# Patient Record
Sex: FEMALE | Race: WHITE | NOT HISPANIC OR LATINO | Employment: FULL TIME | URBAN - METROPOLITAN AREA
[De-identification: names, ages, dates, MRNs, and addresses within clinical notes are randomized per-mention and may not be internally consistent; named-entity substitution may affect disease eponyms.]

---
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
## 2021-01-09 ENCOUNTER — HEALTH MAINTENANCE LETTER (OUTPATIENT)
Age: 21
End: 2021-01-09

## 2021-08-11 ENCOUNTER — HOSPITAL ENCOUNTER (EMERGENCY)
Facility: CLINIC | Age: 21
Discharge: HOME | End: 2021-08-12
Attending: EMERGENCY MEDICINE | Admitting: EMERGENCY MEDICINE
Payer: COMMERCIAL

## 2021-08-11 ENCOUNTER — APPOINTMENT (OUTPATIENT)
Dept: CT IMAGING | Facility: CLINIC | Age: 21
End: 2021-08-11
Attending: EMERGENCY MEDICINE
Payer: COMMERCIAL

## 2021-08-11 VITALS
SYSTOLIC BLOOD PRESSURE: 111 MMHG | TEMPERATURE: 97.3 F | RESPIRATION RATE: 20 BRPM | HEART RATE: 76 BPM | OXYGEN SATURATION: 99 % | DIASTOLIC BLOOD PRESSURE: 63 MMHG

## 2021-08-11 DIAGNOSIS — S09.90XA CLOSED HEAD INJURY, INITIAL ENCOUNTER: ICD-10-CM

## 2021-08-11 DIAGNOSIS — S00.03XA CONTUSION OF SCALP, INITIAL ENCOUNTER: ICD-10-CM

## 2021-08-11 PROCEDURE — 70450 CT HEAD/BRAIN W/O DYE: CPT

## 2021-08-11 PROCEDURE — 250N000013 HC RX MED GY IP 250 OP 250 PS 637: Performed by: EMERGENCY MEDICINE

## 2021-08-11 PROCEDURE — 250N000011 HC RX IP 250 OP 636: Performed by: EMERGENCY MEDICINE

## 2021-08-11 PROCEDURE — 99284 EMERGENCY DEPT VISIT MOD MDM: CPT | Mod: 25

## 2021-08-11 RX ORDER — ONDANSETRON 4 MG/1
4 TABLET, ORALLY DISINTEGRATING ORAL ONCE
Status: COMPLETED | OUTPATIENT
Start: 2021-08-11 | End: 2021-08-11

## 2021-08-11 RX ORDER — ACETAMINOPHEN 500 MG
1000 TABLET ORAL ONCE
Status: COMPLETED | OUTPATIENT
Start: 2021-08-11 | End: 2021-08-11

## 2021-08-11 RX ADMIN — ONDANSETRON 4 MG: 4 TABLET, ORALLY DISINTEGRATING ORAL at 22:59

## 2021-08-11 RX ADMIN — ACETAMINOPHEN 1000 MG: 500 TABLET, FILM COATED ORAL at 22:59

## 2021-08-11 ASSESSMENT — ENCOUNTER SYMPTOMS
NECK PAIN: 0
VOMITING: 0
DIZZINESS: 1
CONFUSION: 1
HEADACHES: 1
NAUSEA: 1

## 2021-08-12 NOTE — ED NOTES
"Pt states headache 7/10 following \"nic\" falling from ceiling while in a chair. Pt endorses nausea and photophobia. Pupils 6mm equal bilaterally, PERRLA.  "

## 2021-08-12 NOTE — ED PROVIDER NOTES
History   Chief Complaint:  Head Pain     HPI   Libra Kelly is a 21 year old female who presents after being hit on the head by a dowel from a wooden swing. She was on a swing tonight which held by a wooden dowel. The dowel unexpectedly fell and hit the top of her head at approximately 2000 tonight. She denies any loss of consciousness, but developed headaches, nausea, and dizziness. Friend states she has been more confused and drowsy than her baseline. She denies any recent or ongoing vomiting or neck pain. She is otherwise healthy and denies any current medical problems. She did not take any Tylenol prior to arrival. She is vaccinated against COVID-19 and denies any current cold symptoms. She denies any currently known pregnancies.     Review of Systems   Gastrointestinal: Positive for nausea. Negative for vomiting.   Musculoskeletal: Negative for neck pain.   Neurological: Positive for dizziness and headaches. Negative for syncope.   Psychiatric/Behavioral: Positive for confusion.   All other systems reviewed and are negative.      Allergies:  Amoxicillin  Penicillin G    Medications:  The patient is not currently taking any prescribed medications.    Past Medical History:    The patient denies any significant past medical history.    Social History:  Arrives to the emergency department with a friend.     Physical Exam     Patient Vitals for the past 24 hrs:   BP Temp Temp src Pulse Resp SpO2   08/11/21 2132 111/63 97.3  F (36.3  C) Temporal 76 20 99 %       Physical Exam  General: Adult female sitting in a dimly lit room  Eyes: PERRL, Conjunctive within normal limits. EOMI.  HENT: Palpable hematoma and tenderness on the top of the head.  No skull deformity.  No hemotympanum.  Moist mucous membranes, oropharynx clear.   Neck: Nontender.  Normal active range of motion.  CV: Regular rate and rhythm  Resp:  Normal respiratory effort.  MSK: Moves all extremities equally.  Skin: Warm and dry. No rashes or  lesions or ecchymoses on visible skin.  Neuro: Alert and oriented. Responds appropriately to all questions and commands. No focal findings appreciated. Normal muscle tone.  Psych: Normal mood and affect.     Emergency Department Course     Imaging:  CT Head w/o Contrast  1.  No acute intracranial hemorrhage or calvarial fracture.  2.  Evidence of a possible Chiari I malformation. This could be further assessed by MRI.  Reading per radiology    Emergency Department Course:    Reviewed:  I reviewed nursing notes, vitals and past medical history    Assessments:  2249 I obtained history and examined the patient as noted above.   0050 I rechecked the patient and explained findings. She feels improved.     Interventions:  2259 Tylenol 1,000 mg PO  2259 Zofran 4 mg PO    Disposition:  The patient was discharged to home.     Impression & Plan     Medical Decision Making:    Libra Kelly is a 21 year old female who presents with a history of head injury.  The differential diagnosis includes skull fracture, epidural hematoma, subdural hematoma, intracerebral hemorrhage, and traumatic subarachnoid hemorrhage.  There are no physical signs of skull fracture or other bony fracture on exam and the patient is well-appearing, but due to the severity of headache and the stated confusion by a friend, a CT of the head was indicated.  Patient was not altered here in the emergency department but did appear uncomfortable.  Fortunately, no signs of intracerebral bleed or skull fracture were detected during this visit on CT imaging.  The patient understands that she should return to the emergency department should symptoms worsen despite worrisome findings on initial imaging.  I discussed the incidental possibility of a Chiari malformation.  Emergent MRI is not indicated.  The patient does not have chronic headaches and this can be further evaluation outpatient if thought indicated after discussion with her primary care provider.    Closed head injury instructions were given.  The patient was feeling improved on reassessment and was appropriate for discharge home.  All questions were answered prior to discharge    Diagnosis:    ICD-10-CM    1. Closed head injury, initial encounter  S09.90XA    2. Contusion of scalp, initial encounter  S00.03XA      Scribe Disclosure:  I, David Adrianne, am serving as a scribe at 10:13 PM on 8/11/2021 to document services personally performed by Silvia Spivey MD based on my observations and the provider's statements to me.              Silvia Spivey MD  08/12/21 9008

## 2021-08-12 NOTE — ED TRIAGE NOTES
Pt aox4, ABCs intact. Pt c/o concussion after she was sitting in a chair suspended from the ceiling and the piece of wood holding the chair up gave away and hit her in the head. Pt now having headache, nausea, and sensitivity to light and noise. Pt states that she has had 4 prior concussions.

## 2021-10-11 ENCOUNTER — HEALTH MAINTENANCE LETTER (OUTPATIENT)
Age: 21
End: 2021-10-11

## 2022-01-30 ENCOUNTER — HEALTH MAINTENANCE LETTER (OUTPATIENT)
Age: 22
End: 2022-01-30

## 2022-05-13 ENCOUNTER — HOSPITAL ENCOUNTER (EMERGENCY)
Facility: CLINIC | Age: 22
Discharge: HOME | End: 2022-05-14
Attending: EMERGENCY MEDICINE | Admitting: EMERGENCY MEDICINE
Payer: COMMERCIAL

## 2022-05-13 DIAGNOSIS — R10.32 ABDOMINAL PAIN, LEFT LOWER QUADRANT: ICD-10-CM

## 2022-05-13 LAB
ALBUMIN UR-MCNC: NEGATIVE MG/DL
APPEARANCE UR: CLEAR
BILIRUB UR QL STRIP: NEGATIVE
COLOR UR AUTO: NORMAL
GLUCOSE UR STRIP-MCNC: NEGATIVE MG/DL
HGB UR QL STRIP: NEGATIVE
HOLD SPECIMEN: NORMAL
KETONES UR STRIP-MCNC: NEGATIVE MG/DL
LEUKOCYTE ESTERASE UR QL STRIP: NEGATIVE
NITRATE UR QL: NEGATIVE
PH UR STRIP: 7 [PH] (ref 5–7)
SP GR UR STRIP: 1 (ref 1–1.03)
UROBILINOGEN UR STRIP-MCNC: NORMAL MG/DL

## 2022-05-13 PROCEDURE — 81003 URINALYSIS AUTO W/O SCOPE: CPT | Performed by: EMERGENCY MEDICINE

## 2022-05-13 PROCEDURE — 36415 COLL VENOUS BLD VENIPUNCTURE: CPT | Performed by: EMERGENCY MEDICINE

## 2022-05-13 PROCEDURE — 99285 EMERGENCY DEPT VISIT HI MDM: CPT | Mod: 25 | Performed by: EMERGENCY MEDICINE

## 2022-05-13 PROCEDURE — 83690 ASSAY OF LIPASE: CPT | Performed by: EMERGENCY MEDICINE

## 2022-05-13 PROCEDURE — 85004 AUTOMATED DIFF WBC COUNT: CPT | Performed by: EMERGENCY MEDICINE

## 2022-05-13 PROCEDURE — 84703 CHORIONIC GONADOTROPIN ASSAY: CPT | Performed by: EMERGENCY MEDICINE

## 2022-05-13 PROCEDURE — 99285 EMERGENCY DEPT VISIT HI MDM: CPT | Performed by: EMERGENCY MEDICINE

## 2022-05-13 PROCEDURE — 80053 COMPREHEN METABOLIC PANEL: CPT | Performed by: EMERGENCY MEDICINE

## 2022-05-13 PROCEDURE — 81001 URINALYSIS AUTO W/SCOPE: CPT | Performed by: EMERGENCY MEDICINE

## 2022-05-14 ENCOUNTER — APPOINTMENT (OUTPATIENT)
Dept: CT IMAGING | Facility: CLINIC | Age: 22
End: 2022-05-14
Attending: EMERGENCY MEDICINE
Payer: COMMERCIAL

## 2022-05-14 ENCOUNTER — APPOINTMENT (OUTPATIENT)
Dept: ULTRASOUND IMAGING | Facility: CLINIC | Age: 22
End: 2022-05-14
Attending: EMERGENCY MEDICINE
Payer: COMMERCIAL

## 2022-05-14 VITALS
OXYGEN SATURATION: 96 % | SYSTOLIC BLOOD PRESSURE: 99 MMHG | TEMPERATURE: 97.9 F | WEIGHT: 160 LBS | HEART RATE: 51 BPM | DIASTOLIC BLOOD PRESSURE: 65 MMHG | BODY MASS INDEX: 23.63 KG/M2 | RESPIRATION RATE: 16 BRPM

## 2022-05-14 LAB
ALBUMIN SERPL-MCNC: 4.2 G/DL (ref 3.4–5)
ALBUMIN UR-MCNC: NEGATIVE MG/DL
ALP SERPL-CCNC: 76 U/L (ref 40–150)
ALT SERPL W P-5'-P-CCNC: 22 U/L (ref 0–50)
ANION GAP SERPL CALCULATED.3IONS-SCNC: 6 MMOL/L (ref 3–14)
APPEARANCE UR: CLEAR
AST SERPL W P-5'-P-CCNC: 24 U/L (ref 0–45)
BACTERIA #/AREA URNS HPF: ABNORMAL /HPF
BASOPHILS # BLD AUTO: 0 10E3/UL (ref 0–0.2)
BASOPHILS NFR BLD AUTO: 0 %
BILIRUB SERPL-MCNC: 0.3 MG/DL (ref 0.2–1.3)
BILIRUB UR QL STRIP: NEGATIVE
BUN SERPL-MCNC: 15 MG/DL (ref 7–30)
CALCIUM SERPL-MCNC: 9.2 MG/DL (ref 8.5–10.1)
CHLORIDE BLD-SCNC: 105 MMOL/L (ref 94–109)
CO2 SERPL-SCNC: 26 MMOL/L (ref 20–32)
COLOR UR AUTO: ABNORMAL
CREAT SERPL-MCNC: 0.8 MG/DL (ref 0.52–1.04)
EOSINOPHIL # BLD AUTO: 0.1 10E3/UL (ref 0–0.7)
EOSINOPHIL NFR BLD AUTO: 1 %
ERYTHROCYTE [DISTWIDTH] IN BLOOD BY AUTOMATED COUNT: 11.9 % (ref 10–15)
GFR SERPL CREATININE-BSD FRML MDRD: >90 ML/MIN/1.73M2
GLUCOSE BLD-MCNC: 91 MG/DL (ref 70–99)
GLUCOSE UR STRIP-MCNC: NEGATIVE MG/DL
HCG SERPL QL: NEGATIVE
HCT VFR BLD AUTO: 40.9 % (ref 35–47)
HGB BLD-MCNC: 13.2 G/DL (ref 11.7–15.7)
HGB UR QL STRIP: NEGATIVE
IMM GRANULOCYTES # BLD: 0 10E3/UL
IMM GRANULOCYTES NFR BLD: 0 %
INR PPP: 1.1 (ref 0.85–1.15)
KETONES UR STRIP-MCNC: NEGATIVE MG/DL
LACTATE SERPL-SCNC: 0.6 MMOL/L (ref 0.7–2)
LEUKOCYTE ESTERASE UR QL STRIP: NEGATIVE
LIPASE SERPL-CCNC: 113 U/L (ref 73–393)
LYMPHOCYTES # BLD AUTO: 3.2 10E3/UL (ref 0.8–5.3)
LYMPHOCYTES NFR BLD AUTO: 34 %
MCH RBC QN AUTO: 28.2 PG (ref 26.5–33)
MCHC RBC AUTO-ENTMCNC: 32.3 G/DL (ref 31.5–36.5)
MCV RBC AUTO: 87 FL (ref 78–100)
MONOCYTES # BLD AUTO: 0.5 10E3/UL (ref 0–1.3)
MONOCYTES NFR BLD AUTO: 5 %
NEUTROPHILS # BLD AUTO: 5.5 10E3/UL (ref 1.6–8.3)
NEUTROPHILS NFR BLD AUTO: 60 %
NITRATE UR QL: NEGATIVE
NRBC # BLD AUTO: 0 10E3/UL
NRBC BLD AUTO-RTO: 0 /100
PH UR STRIP: 7 [PH] (ref 5–7)
PLATELET # BLD AUTO: 384 10E3/UL (ref 150–450)
POTASSIUM BLD-SCNC: 3.9 MMOL/L (ref 3.4–5.3)
PROT SERPL-MCNC: 7.9 G/DL (ref 6.8–8.8)
RBC # BLD AUTO: 4.68 10E6/UL (ref 3.8–5.2)
RBC URINE: 0 /HPF
SODIUM SERPL-SCNC: 137 MMOL/L (ref 133–144)
SP GR UR STRIP: 1 (ref 1–1.03)
SQUAMOUS EPITHELIAL: 1 /HPF
UROBILINOGEN UR STRIP-MCNC: NORMAL MG/DL
WBC # BLD AUTO: 9.3 10E3/UL (ref 4–11)
WBC URINE: <1 /HPF

## 2022-05-14 PROCEDURE — 76830 TRANSVAGINAL US NON-OB: CPT

## 2022-05-14 PROCEDURE — 76856 US EXAM PELVIC COMPLETE: CPT | Mod: 26 | Performed by: RADIOLOGY

## 2022-05-14 PROCEDURE — 96376 TX/PRO/DX INJ SAME DRUG ADON: CPT | Performed by: EMERGENCY MEDICINE

## 2022-05-14 PROCEDURE — 74177 CT ABD & PELVIS W/CONTRAST: CPT | Mod: 26 | Performed by: RADIOLOGY

## 2022-05-14 PROCEDURE — 85610 PROTHROMBIN TIME: CPT | Performed by: EMERGENCY MEDICINE

## 2022-05-14 PROCEDURE — 258N000003 HC RX IP 258 OP 636: Performed by: EMERGENCY MEDICINE

## 2022-05-14 PROCEDURE — 74177 CT ABD & PELVIS W/CONTRAST: CPT

## 2022-05-14 PROCEDURE — 250N000011 HC RX IP 250 OP 636: Performed by: EMERGENCY MEDICINE

## 2022-05-14 PROCEDURE — 76830 TRANSVAGINAL US NON-OB: CPT | Mod: 26 | Performed by: RADIOLOGY

## 2022-05-14 PROCEDURE — 96361 HYDRATE IV INFUSION ADD-ON: CPT | Performed by: EMERGENCY MEDICINE

## 2022-05-14 PROCEDURE — 83605 ASSAY OF LACTIC ACID: CPT | Performed by: EMERGENCY MEDICINE

## 2022-05-14 PROCEDURE — 36415 COLL VENOUS BLD VENIPUNCTURE: CPT | Performed by: EMERGENCY MEDICINE

## 2022-05-14 PROCEDURE — 96374 THER/PROPH/DIAG INJ IV PUSH: CPT | Mod: 59 | Performed by: EMERGENCY MEDICINE

## 2022-05-14 RX ORDER — IOPAMIDOL 755 MG/ML
99 INJECTION, SOLUTION INTRAVASCULAR ONCE
Status: COMPLETED | OUTPATIENT
Start: 2022-05-14 | End: 2022-05-14

## 2022-05-14 RX ORDER — HYDROMORPHONE HCL IN WATER/PF 6 MG/30 ML
0.2 PATIENT CONTROLLED ANALGESIA SYRINGE INTRAVENOUS ONCE
Status: COMPLETED | OUTPATIENT
Start: 2022-05-14 | End: 2022-05-14

## 2022-05-14 RX ORDER — HYDROMORPHONE HYDROCHLORIDE 1 MG/ML
0.5 INJECTION, SOLUTION INTRAMUSCULAR; INTRAVENOUS; SUBCUTANEOUS ONCE
Status: COMPLETED | OUTPATIENT
Start: 2022-05-14 | End: 2022-05-14

## 2022-05-14 RX ADMIN — HYDROMORPHONE HYDROCHLORIDE 0.5 MG: 1 INJECTION, SOLUTION INTRAMUSCULAR; INTRAVENOUS; SUBCUTANEOUS at 05:24

## 2022-05-14 RX ADMIN — SODIUM CHLORIDE 1000 ML: 9 INJECTION, SOLUTION INTRAVENOUS at 02:00

## 2022-05-14 RX ADMIN — HYDROMORPHONE HYDROCHLORIDE 0.2 MG: 0.2 INJECTION, SOLUTION INTRAMUSCULAR; INTRAVENOUS; SUBCUTANEOUS at 02:52

## 2022-05-14 RX ADMIN — IOPAMIDOL 99 ML: 755 INJECTION, SOLUTION INTRAVENOUS at 05:33

## 2022-05-14 ASSESSMENT — ENCOUNTER SYMPTOMS
ABDOMINAL PAIN: 1
PALPITATIONS: 0
CHILLS: 1
COUGH: 0
WEAKNESS: 0
NECK PAIN: 0
HEMATURIA: 0
DIAPHORESIS: 0
NUMBNESS: 0
VOMITING: 0
DIARRHEA: 0
SHORTNESS OF BREATH: 0
ABDOMINAL DISTENTION: 0
CONSTIPATION: 0
FLANK PAIN: 0
ANAL BLEEDING: 0
BLOOD IN STOOL: 0
BACK PAIN: 0
EYES NEGATIVE: 1
FEVER: 0
FATIGUE: 0
NAUSEA: 1
DYSURIA: 0
LIGHT-HEADEDNESS: 0

## 2022-05-14 NOTE — ED TRIAGE NOTES
Patient arrives ambulatory to triage with c/o abdominal pain that started in  LLQ, now generalized. Patient guarding. Reports nausea.      Triage Assessment     Row Name 05/13/22 9825       Triage Assessment (Adult)    Airway WDL WDL       Respiratory WDL    Respiratory WDL WDL       Skin Circulation/Temperature WDL    Skin Circulation/Temperature WDL WDL       Cardiac WDL    Cardiac WDL WDL       Peripheral/Neurovascular WDL    Peripheral Neurovascular WDL WDL       Cognitive/Neuro/Behavioral WDL    Cognitive/Neuro/Behavioral WDL WDL

## 2022-05-14 NOTE — DISCHARGE INSTRUCTIONS
Thank you for your patience today.  Please follow-up with your regular doctor or in one of our clinics in the next 2-3 days for further evaluation and follow-up care.  Please call to schedule an appointment.  [facility] [phone] or [facility] [phone]. Please continue your own medications.  Please take Tylenol or ibuprofen as needed for pain.  Please return to the ER if you develop high fever, severe pain, persistent vomiting, or any worsening of your current symptoms.  It was a pleasure taking care of you today.  We hope you feel better soon.

## 2022-05-14 NOTE — ED PROVIDER NOTES
ED Provider Note  [facility]      History     Chief Complaint   Patient presents with     Abdominal Pain     HPI  Libra Kelly is a 22 year old female, generally healthy outside of some  anxiety, depression and intermittent shortness of breath associated with such. Patient presents today, accompanied by her roommate, with abdominal pain.    Patient is otherwise healthy, has never had any other issues like this before, no other abdominal history, surgeries, etc. reported.  No known  history such as UTIs, kidney stones or ovary/uterus issues.  Did not take anything for pain prior to arrival as she came right from the location of her pain onset to the ED.    She was at a devotional this evening, was just sitting during this event, and had noticed some general, more mild discomfort in her low abdomen, particularly in the left lower quadrant.  She first noticed this around 8 PM, and then around 9 PM or or so, she had gotten up to go to the bathroom, and had a severe onset of pain that doubled her over, and she almost collapsed because of how severe the pain was (no lightheadedness, vertigo, syncope, near syncope; no traumas/falls/injuries), just a very robust response to the pain itself.  Was so severe she felt as though she could not even stand up after having lowered her self down to the ground from the pain.  Eventually she was able to regroup enough to be able to come here.  She was still quite uncomfortable when she was initially in the lobby, perhaps slightly improved having been roomed, but certainly still symptomatic and not resolved.    Patient reports she has never had discomfort like this before.  Pain initiated somewhat gradually in the LLQ of the abdomen and then seemed to spread throughout their across the whole of the abdomen, and she felt even some degree of discomfort in the back.  Not feeling that severe radiation of pain at this time.  Still having some diffuse pain  throughout the abdomen, but worse in the LLQ. No fevers but has felt somewhat chilled since she has been come more uncomfortable.  No diaphoresis.  Has been nauseous but has not vomited. Her last BM was this afternoon and normal for her.  Has not had any issues with constipation, diarrhea, etc.  No blood, still able to pass flatus.  Has been able to urinate since the onset of the more mild degree of discomfort and had no issues with that, no dysuria, blood, etc.  Her last menstrual period was about 2 weeks ago.  She denies any chance or concern for STIs or pregnancy.  No chest or breathing symptoms.  No extremity symptoms. Negative safety screen. No other symptoms or complaints at this time. Please see ROS for further details.      Past Medical History  PMH: No significant PMH in our system.  In review of Care Everywhere see that she has a history of some intermittent shortness of breath, anxiety, depression.  PSH: No previous pertinent surgeries  History reviewed. No pertinent surgical history.  ibuprofen (ADVIL/MOTRIN) 600 MG tablet  magic mouthwash (ENTER INGREDIENTS IN COMMENTS) suspension      Allergies   Allergen Reactions     Amoxicillin Angioedema and Hives     Penicillin G Angioedema and Hives     Family History  History reviewed. No pertinent family history.  Social History   Social History     Tobacco Use     Smoking status: Never Smoker     Smokeless tobacco: Never Used   Substance Use Topics     Alcohol use: Yes     Drug use: Not Currently      Past medical history, past surgical history, medications, allergies, family history, and social history were reviewed with the patient. No additional pertinent items.       Review of Systems   Constitutional: Positive for chills. Negative for diaphoresis, fatigue and fever.   HENT: Negative.    Eyes: Negative.    Respiratory: Negative for cough and shortness of breath.    Cardiovascular: Negative for chest pain and palpitations.   Gastrointestinal: Positive for  abdominal pain and nausea. Negative for abdominal distention, anal bleeding, blood in stool, constipation, diarrhea and vomiting.   Genitourinary: Negative for decreased urine volume, dysuria, flank pain, hematuria, urgency, vaginal bleeding, vaginal discharge and vaginal pain.        Low/LLQ abd pain   Musculoskeletal: Negative for back pain (had some earlier radiating from abdomen but that part resolved) and neck pain.   Skin: Negative.    Allergic/Immunologic: Negative for immunocompromised state.   Neurological: Negative for syncope, weakness, light-headedness and numbness.   Psychiatric/Behavioral: Negative for suicidal ideas.   All other systems reviewed and are negative.     A complete review of systems was performed with pertinent positives and negatives noted in the HPI, and all other systems negative.    Physical Exam   BP: 113/68  Pulse: 70  Temp: 97.9  F (36.6  C)  Resp: 16  Weight: 72.6 kg (160 lb)  SpO2: 98 %  Physical Exam  CONSTITUTIONAL: Well-developed and well-nourished. Awake and alert. Non-toxic appearance. No acute distress.   HENT:   - Head: Normocephalic and atraumatic.   - Ears: Hearing and external ear grossly normal.   - Nose: Nose normal. No rhinorrhea. No epistaxis.   - Mouth/Throat: MMM  EYES: Conjunctivae and lids are normal. No scleral icterus.   NECK: Normal range of motion and phonation normal. Neck supple.  No tracheal deviation, no stridor. No edema or erythema noted.  CARDIOVASCULAR: Normal rate, regular rhythm and no appreciable abnormal heart sounds.  PULMONARY/CHEST: Normal work of breathing. No accessory muscle usage or stridor. No respiratory distress.  No appreciable abnormal breath sounds.  ABDOMEN: Soft, non-distended. Very mild tenderness generally throughout the mid and low abdomen, but much worse in the LLQ. No peritoneal findings, no rigidity, rebound or guarding.  No palpable masses or abnormal pulsatility appreciated.  : Exam initially deferred while getting pelvic  US as no VB, discharge, etc.  MUSCULOSKELETAL: Extremities warm and seemingly well perfused. No edema or calf tenderness.  NEUROLOGIC: Awake, alert. Not disoriented. She displays no atrophy and no tremor. Normal tone. No seizure activity. GCS 15  SKIN: Skin is warm and dry. No rash noted. No diaphoresis. No pallor.   PSYCHIATRIC: Normal mood and affect. Speech and behavior normal. Thought processes linear. Cognition and memory are normal.        Assessments & Plan (with Medical Decision Making)   IMPRESSION: 22 year old female, generally healthy outside of some  anxiety, depression and intermittent shortness of breath associated with such. Patient presents today, accompanied by her roommate, with abdominal pain.    Clinically, patient appears nontoxic, no lance distress, but does appear somewhat uncomfortable.  Vitals grossly WNL.  Otherwise on examination, she does have some mild diffuse discomfort throughout the mid and low abdomen, though it is notably worse in the LLQ.  No lance peritoneal findings and I am unable to palpate any masses, abnormal pulsatility, etc.  No other acute findings on exam.    Ddx includes, but not limited to, kidney stone, UTI, mittelschmerz, torsion, other ovarian cyst issue.  Think unlikely STI, PID, TOA, etc. based on history.  Denies any chance of pregnancy for this to be pregnancy related or ectopic (though will confirm on laboratory testing).  No change to bowel or bladder for this to be colitis, diverticulitis etc.  Think unlikely epiploic appendagitis, mesenteric adenitis, etc.  Wrong location for appendicitis presuming she has normal anatomy (did consider rare chance of abnormal positioning of the appendix to the LLQ, but think that to be quite unlikely.    PLAN: Laboratory studies, urine studies, abdominal imaging (initial UA returned negative for blood and infection, so we will start with ultrasound to evaluate further.)  - Risks/benefits of pursuing imaging reviewed and  accepted.  We will start with ultrasound as I think this may be more likely a  origin such as a cyst or mittelschmerz, and will make sure to exclude torsion, but if no acute findings on such, she may need more advanced imaging despite shifting R/B for each imaging modality vs. Obs. Patient expresses understanding.)  - Given the lack of vaginal bleeding, vaginal discharge, etc. and lack of potential exposure for STI or pregnancy, think we can hold off on formal pelvic exam/speculum exam at this time, but can revisit this following the pelvic ultrasound, especially if any abnormal/suspicious findings.   - Disposition pending ED course, findings, and clinical reassessments.    RESULTS:  - Labs: No acute findings on CMP, lipase normal  --- Pregnancy negative  --- No acute findings on CBC, no lactate elevation  - Urine: No apparent UTI, no blood  - Imaging: US pending at shift change    INTERVENTIONS:   - IVF  - IV Dilaudid    RE-EVALUATION:  - Pt continues to be uncomfortable, but otherwise continues to do well here in the ED, no acute issues or apparent concerning changes in vitals or clinical appearance.    DISCUSSIONS:  - w/ Patient: I have reviewed the available findings, tentative plan with the patient and her roommate. They expressed understanding and agreement with this plan. All questions answered to the best of our ability at this time.     SIGNOUT:  - Patient signed out to overnight EM Physician.  - Impression at shift change: Abdominal pain (worse in the LLQ), nausea  - Pending at shift change: Pelvic US  - Tentative plan: F/U Pelvic US, clinically reassess, dispo pending results, and clinical reassessment.   --- Manage positive findings accordingly  --- If US negative and still symptomatic, consider CT (already discussed somewhat w/ patient, reviewed R/B/A)  --- If US negative and symptoms improved/resolved, may be able to discharge home w/ close outpatient F/U within the next couple of days w/ strict  return precautions.         ______________________________________________________________________        --  Julianne Peraza MD  [facility] EMERGENCY DEPARTMENT  5/13/2022     Julianne Peraza MD  05/14/22 1530

## 2022-05-14 NOTE — ED PROVIDER NOTES
Patient signed out to me by Dr. Peraza.     Patient is a healthy 22-year-old female who presents the emergency department for evaluation of abdominal pain.  Pending pelvic ultrasound at the time of signout along with reevaluation, and if negative ultrasound with ongoing pain recommend CT imaging.    Pelvic ultrasound normal, on reexamination still reporting pain in her left lower abdomen, abdomen is soft, with some mild tenderness to patient in her left lower abdomen with no rebound, guarding, no peritoneal signs.  I discussed with patient at this time and she would like to proceed with CT imaging.  Patient provided with additional dose of Dilaudid for pain.    I reviewed CT scan of the abdomen pelvis which is unremarkable with no focal inflammatory change or infectious process in the abdomen pelvis.  There is a small amount of pelvic free fluid which could be physiological in nature.    Discussed results with patient.  Unclear etiology of patient's abdominal pain.  No signs of acute infection, obstruction, ovarian torsion.  At this time plan for discharge home with close outpatient follow-up.  Recommend supportive care, pain control with Tylenol, ibuprofen.  Return precautions discussed if high fever, severe abdominal pain, persistent vomiting, or any worsening symptoms.  Patient understands and agrees with plan.     Rufina Gillis MD  05/14/22 [id number]

## 2022-09-24 ENCOUNTER — HEALTH MAINTENANCE LETTER (OUTPATIENT)
Age: 22
End: 2022-09-24

## 2023-01-29 ENCOUNTER — HEALTH MAINTENANCE LETTER (OUTPATIENT)
Age: 23
End: 2023-01-29

## 2024-03-02 ENCOUNTER — HEALTH MAINTENANCE LETTER (OUTPATIENT)
Age: 24
End: 2024-03-02

## 2025-03-15 ENCOUNTER — HEALTH MAINTENANCE LETTER (OUTPATIENT)
Age: 25
End: 2025-03-15